# Patient Record
Sex: FEMALE | URBAN - METROPOLITAN AREA
[De-identification: names, ages, dates, MRNs, and addresses within clinical notes are randomized per-mention and may not be internally consistent; named-entity substitution may affect disease eponyms.]

---

## 2020-12-09 ENCOUNTER — NEW PATIENT (OUTPATIENT)
Dept: URBAN - METROPOLITAN AREA CLINIC 64 | Facility: CLINIC | Age: 25
End: 2020-12-09
Payer: COMMERCIAL

## 2020-12-09 DIAGNOSIS — H52.13 MYOPIA, BILATERAL: Primary | ICD-10-CM

## 2020-12-09 PROCEDURE — 92015 DETERMINE REFRACTIVE STATE: CPT | Performed by: OPTOMETRIST

## 2020-12-09 PROCEDURE — 92004 COMPRE OPH EXAM NEW PT 1/>: CPT | Performed by: OPTOMETRIST

## 2020-12-09 ASSESSMENT — KERATOMETRY
OD: 43.66
OS: 43.52

## 2020-12-09 ASSESSMENT — VISUAL ACUITY
OS: 20/20
OD: 20/20

## 2020-12-09 ASSESSMENT — INTRAOCULAR PRESSURE
OS: 18
OD: 18

## 2021-12-09 ENCOUNTER — OFFICE VISIT (OUTPATIENT)
Dept: URBAN - METROPOLITAN AREA CLINIC 64 | Facility: CLINIC | Age: 26
End: 2021-12-09
Payer: COMMERCIAL

## 2021-12-09 DIAGNOSIS — C43.9 MELANOMA: ICD-10-CM

## 2021-12-09 DIAGNOSIS — H01.111 ALLERGIC DERMATITIS OF RIGHT UPPER EYELID: ICD-10-CM

## 2021-12-09 PROCEDURE — 92014 COMPRE OPH EXAM EST PT 1/>: CPT | Performed by: OPTOMETRIST

## 2021-12-09 RX ORDER — TRIAMCINOLONE ACETONIDE 1 MG/G
0.1 % CREAM TOPICAL
Qty: 1 | Refills: 1 | Status: ACTIVE
Start: 2021-12-09

## 2021-12-09 ASSESSMENT — VISUAL ACUITY
OD: 20/20
OS: 20/20

## 2021-12-09 ASSESSMENT — INTRAOCULAR PRESSURE
OS: 14
OD: 15

## 2021-12-09 ASSESSMENT — KERATOMETRY
OS: 43.55
OD: 43.63

## 2021-12-09 NOTE — IMPRESSION/PLAN
Impression: Melanoma: C43.9. Plan: Hx of melanoma removed from back. No ocular melanoma today OU. Optos today.

## 2021-12-09 NOTE — IMPRESSION/PLAN
Impression: Myopia, bilateral Plan: Updated glasses and contact lens Rx. Normally wears glasses. Trials before boxes.

## 2021-12-09 NOTE — IMPRESSION/PLAN
Impression: Allergic dermatitis of right upper eyelid: H01.111. Plan: Use triamcinolone cream bid OD for up to two weeks.

## 2022-12-14 ENCOUNTER — OFFICE VISIT (OUTPATIENT)
Dept: URBAN - METROPOLITAN AREA CLINIC 64 | Facility: CLINIC | Age: 27
End: 2022-12-14
Payer: COMMERCIAL

## 2022-12-14 DIAGNOSIS — C43.9 MALIGNANT MELANOMA OF SKIN, UNSPECIFIED: ICD-10-CM

## 2022-12-14 DIAGNOSIS — H52.13 MYOPIA, BILATERAL: Primary | ICD-10-CM

## 2022-12-14 PROCEDURE — 92014 COMPRE OPH EXAM EST PT 1/>: CPT

## 2022-12-14 ASSESSMENT — INTRAOCULAR PRESSURE
OD: 21
OS: 17

## 2022-12-14 ASSESSMENT — VISUAL ACUITY
OD: 20/20
OS: 20/20

## 2022-12-14 ASSESSMENT — KERATOMETRY: OD: 43.60

## 2022-12-14 NOTE — IMPRESSION/PLAN
Impression: Malignant melanoma of skin, unspecified: C43.9. Plan: Pt educated. Pt had melanoma in 2018 removed from back. No ocular manifestations. Monitor yearly.

## 2022-12-14 NOTE — IMPRESSION/PLAN
Impression: Myopia, bilateral: H52.13. Plan: Patient educated. Updated SRx released to patient. Monitor yearly Updated CLRx and released to pt. Pt educated on proper CL care and hygiene including no sleeping, swimming, or showering in CL's. If any redness or discharge discontinue CL's and RTC. Monitor.